# Patient Record
Sex: MALE | Race: WHITE | NOT HISPANIC OR LATINO | URBAN - METROPOLITAN AREA
[De-identification: names, ages, dates, MRNs, and addresses within clinical notes are randomized per-mention and may not be internally consistent; named-entity substitution may affect disease eponyms.]

---

## 2021-12-29 ENCOUNTER — OUTPATIENT (OUTPATIENT)
Dept: OUTPATIENT SERVICES | Facility: HOSPITAL | Age: 61
LOS: 1 days | Discharge: HOME | End: 2021-12-29
Payer: COMMERCIAL

## 2021-12-29 DIAGNOSIS — C85.90 NON-HODGKIN LYMPHOMA, UNSPECIFIED, UNSPECIFIED SITE: ICD-10-CM

## 2021-12-29 PROCEDURE — 71260 CT THORAX DX C+: CPT | Mod: 26

## 2021-12-29 PROCEDURE — 74177 CT ABD & PELVIS W/CONTRAST: CPT | Mod: 26

## 2022-01-05 ENCOUNTER — OUTPATIENT (OUTPATIENT)
Dept: OUTPATIENT SERVICES | Facility: HOSPITAL | Age: 62
LOS: 1 days | Discharge: HOME | End: 2022-01-05
Payer: COMMERCIAL

## 2022-01-05 PROCEDURE — 88342 IMHCHEM/IMCYTCHM 1ST ANTB: CPT | Mod: 26

## 2022-01-05 PROCEDURE — 88312 SPECIAL STAINS GROUP 1: CPT | Mod: 26

## 2022-01-05 PROCEDURE — 88305 TISSUE EXAM BY PATHOLOGIST: CPT | Mod: 26

## 2022-01-05 PROCEDURE — 88172 CYTP DX EVAL FNA 1ST EA SITE: CPT | Mod: 26,59

## 2022-01-05 PROCEDURE — 88307 TISSUE EXAM BY PATHOLOGIST: CPT | Mod: 26

## 2022-01-05 PROCEDURE — 76942 ECHO GUIDE FOR BIOPSY: CPT | Mod: 26

## 2022-01-05 PROCEDURE — 88173 CYTOPATH EVAL FNA REPORT: CPT | Mod: 26,59

## 2022-01-05 PROCEDURE — 88333 PATH CONSLTJ SURG CYTO XM 1: CPT | Mod: 26,59

## 2022-01-05 PROCEDURE — 88189 FLOWCYTOMETRY/READ 16 & >: CPT

## 2022-01-05 PROCEDURE — 88112 CYTOPATH CELL ENHANCE TECH: CPT | Mod: 26,59

## 2022-01-05 PROCEDURE — 38505 NEEDLE BIOPSY LYMPH NODES: CPT

## 2022-01-05 PROCEDURE — 88341 IMHCHEM/IMCYTCHM EA ADD ANTB: CPT | Mod: 26

## 2022-01-07 LAB — TM INTERPRETATION: SIGNIFICANT CHANGE UP

## 2022-01-12 LAB
NON-GYNECOLOGICAL CYTOLOGY STUDY: SIGNIFICANT CHANGE UP
NON-GYNECOLOGICAL CYTOLOGY STUDY: SIGNIFICANT CHANGE UP
SURGICAL PATHOLOGY STUDY: SIGNIFICANT CHANGE UP

## 2022-01-19 DIAGNOSIS — R59.0 LOCALIZED ENLARGED LYMPH NODES: ICD-10-CM

## 2024-04-09 PROBLEM — Z00.00 ENCOUNTER FOR PREVENTIVE HEALTH EXAMINATION: Status: ACTIVE | Noted: 2024-04-09

## 2024-05-01 ENCOUNTER — OUTPATIENT (OUTPATIENT)
Dept: OUTPATIENT SERVICES | Facility: HOSPITAL | Age: 64
LOS: 1 days | End: 2024-05-01
Payer: COMMERCIAL

## 2024-05-01 DIAGNOSIS — Z00.8 ENCOUNTER FOR OTHER GENERAL EXAMINATION: ICD-10-CM

## 2024-05-01 DIAGNOSIS — R07.9 CHEST PAIN, UNSPECIFIED: ICD-10-CM

## 2024-05-01 PROCEDURE — 75574 CT ANGIO HRT W/3D IMAGE: CPT

## 2024-05-01 PROCEDURE — 75574 CT ANGIO HRT W/3D IMAGE: CPT | Mod: 26

## 2024-05-02 DIAGNOSIS — R07.9 CHEST PAIN, UNSPECIFIED: ICD-10-CM

## 2024-05-23 ENCOUNTER — INPATIENT (INPATIENT)
Facility: HOSPITAL | Age: 64
LOS: 0 days | Discharge: HOME | DRG: 854 | End: 2024-05-24
Attending: SURGERY | Admitting: SURGERY
Payer: COMMERCIAL

## 2024-05-23 VITALS
HEART RATE: 129 BPM | TEMPERATURE: 101 F | SYSTOLIC BLOOD PRESSURE: 121 MMHG | DIASTOLIC BLOOD PRESSURE: 79 MMHG | OXYGEN SATURATION: 96 % | RESPIRATION RATE: 19 BRPM

## 2024-05-23 DIAGNOSIS — K35.80 UNSPECIFIED ACUTE APPENDICITIS: ICD-10-CM

## 2024-05-23 DIAGNOSIS — A41.9 SEPSIS, UNSPECIFIED ORGANISM: ICD-10-CM

## 2024-05-23 LAB
ALBUMIN SERPL ELPH-MCNC: 4.8 G/DL — SIGNIFICANT CHANGE UP (ref 3.5–5.2)
ALP SERPL-CCNC: 79 U/L — SIGNIFICANT CHANGE UP (ref 30–115)
ALT FLD-CCNC: 16 U/L — SIGNIFICANT CHANGE UP (ref 0–41)
ANION GAP SERPL CALC-SCNC: 14 MMOL/L — SIGNIFICANT CHANGE UP (ref 7–14)
ANION GAP SERPL CALC-SCNC: 9 MMOL/L — SIGNIFICANT CHANGE UP (ref 7–14)
APPEARANCE UR: CLEAR — SIGNIFICANT CHANGE UP
AST SERPL-CCNC: 14 U/L — SIGNIFICANT CHANGE UP (ref 0–41)
BACTERIA # UR AUTO: NEGATIVE /HPF — SIGNIFICANT CHANGE UP
BASE EXCESS BLDV CALC-SCNC: 7.1 MMOL/L — HIGH (ref -2–3)
BASOPHILS # BLD AUTO: 0.03 K/UL — SIGNIFICANT CHANGE UP (ref 0–0.2)
BASOPHILS NFR BLD AUTO: 0.2 % — SIGNIFICANT CHANGE UP (ref 0–1)
BILIRUB SERPL-MCNC: 1.1 MG/DL — SIGNIFICANT CHANGE UP (ref 0.2–1.2)
BILIRUB UR-MCNC: NEGATIVE — SIGNIFICANT CHANGE UP
BUN SERPL-MCNC: 17 MG/DL — SIGNIFICANT CHANGE UP (ref 10–20)
BUN SERPL-MCNC: 18 MG/DL — SIGNIFICANT CHANGE UP (ref 10–20)
CA-I SERPL-SCNC: 1.25 MMOL/L — SIGNIFICANT CHANGE UP (ref 1.15–1.33)
CALCIUM SERPL-MCNC: 10 MG/DL — SIGNIFICANT CHANGE UP (ref 8.4–10.5)
CALCIUM SERPL-MCNC: 9.4 MG/DL — SIGNIFICANT CHANGE UP (ref 8.4–10.5)
CAST: 3 /LPF — SIGNIFICANT CHANGE UP (ref 0–4)
CHLORIDE SERPL-SCNC: 101 MMOL/L — SIGNIFICANT CHANGE UP (ref 98–110)
CHLORIDE SERPL-SCNC: 98 MMOL/L — SIGNIFICANT CHANGE UP (ref 98–110)
CO2 SERPL-SCNC: 26 MMOL/L — SIGNIFICANT CHANGE UP (ref 17–32)
CO2 SERPL-SCNC: 27 MMOL/L — SIGNIFICANT CHANGE UP (ref 17–32)
COLOR SPEC: ABNORMAL
CREAT SERPL-MCNC: 1 MG/DL — SIGNIFICANT CHANGE UP (ref 0.7–1.5)
CREAT SERPL-MCNC: 1.1 MG/DL — SIGNIFICANT CHANGE UP (ref 0.7–1.5)
DIFF PNL FLD: NEGATIVE — SIGNIFICANT CHANGE UP
EGFR: 76 ML/MIN/1.73M2 — SIGNIFICANT CHANGE UP
EGFR: 85 ML/MIN/1.73M2 — SIGNIFICANT CHANGE UP
EOSINOPHIL # BLD AUTO: 0 K/UL — SIGNIFICANT CHANGE UP (ref 0–0.7)
EOSINOPHIL NFR BLD AUTO: 0 % — SIGNIFICANT CHANGE UP (ref 0–8)
GAS PNL BLDV: 133 MMOL/L — LOW (ref 136–145)
GAS PNL BLDV: SIGNIFICANT CHANGE UP
GAS PNL BLDV: SIGNIFICANT CHANGE UP
GLUCOSE SERPL-MCNC: 86 MG/DL — SIGNIFICANT CHANGE UP (ref 70–99)
GLUCOSE SERPL-MCNC: 88 MG/DL — SIGNIFICANT CHANGE UP (ref 70–99)
GLUCOSE UR QL: NEGATIVE MG/DL — SIGNIFICANT CHANGE UP
HCO3 BLDV-SCNC: 31 MMOL/L — HIGH (ref 22–29)
HCT VFR BLD CALC: 44.6 % — SIGNIFICANT CHANGE UP (ref 42–52)
HCT VFR BLD CALC: 46.1 % — SIGNIFICANT CHANGE UP (ref 42–52)
HCT VFR BLDA CALC: 49 % — SIGNIFICANT CHANGE UP (ref 39–51)
HGB BLD CALC-MCNC: 16.3 G/DL — SIGNIFICANT CHANGE UP (ref 12.6–17.4)
HGB BLD-MCNC: 15.2 G/DL — SIGNIFICANT CHANGE UP (ref 14–18)
HGB BLD-MCNC: 16 G/DL — SIGNIFICANT CHANGE UP (ref 14–18)
IMM GRANULOCYTES NFR BLD AUTO: 0.6 % — HIGH (ref 0.1–0.3)
KETONES UR-MCNC: 15 MG/DL
LACTATE BLDV-MCNC: 1.4 MMOL/L — SIGNIFICANT CHANGE UP (ref 0.5–2)
LACTATE SERPL-SCNC: 1.3 MMOL/L — SIGNIFICANT CHANGE UP (ref 0.7–2)
LEUKOCYTE ESTERASE UR-ACNC: NEGATIVE — SIGNIFICANT CHANGE UP
LYMPHOCYTES # BLD AUTO: 0.28 K/UL — LOW (ref 1.2–3.4)
LYMPHOCYTES # BLD AUTO: 1.9 % — LOW (ref 20.5–51.1)
MAGNESIUM SERPL-MCNC: 1.8 MG/DL — SIGNIFICANT CHANGE UP (ref 1.8–2.4)
MCHC RBC-ENTMCNC: 31.2 PG — HIGH (ref 27–31)
MCHC RBC-ENTMCNC: 31.5 PG — HIGH (ref 27–31)
MCHC RBC-ENTMCNC: 34.1 G/DL — SIGNIFICANT CHANGE UP (ref 32–37)
MCHC RBC-ENTMCNC: 34.7 G/DL — SIGNIFICANT CHANGE UP (ref 32–37)
MCV RBC AUTO: 90.7 FL — SIGNIFICANT CHANGE UP (ref 80–94)
MCV RBC AUTO: 91.6 FL — SIGNIFICANT CHANGE UP (ref 80–94)
MONOCYTES # BLD AUTO: 0.76 K/UL — HIGH (ref 0.1–0.6)
MONOCYTES NFR BLD AUTO: 5.2 % — SIGNIFICANT CHANGE UP (ref 1.7–9.3)
NEUTROPHILS # BLD AUTO: 13.4 K/UL — HIGH (ref 1.4–6.5)
NEUTROPHILS NFR BLD AUTO: 92.1 % — HIGH (ref 42.2–75.2)
NITRITE UR-MCNC: NEGATIVE — SIGNIFICANT CHANGE UP
NRBC # BLD: 0 /100 WBCS — SIGNIFICANT CHANGE UP (ref 0–0)
NRBC # BLD: 0 /100 WBCS — SIGNIFICANT CHANGE UP (ref 0–0)
PCO2 BLDV: 41 MMHG — LOW (ref 42–55)
PH BLDV: 7.49 — HIGH (ref 7.32–7.43)
PH UR: 6 — SIGNIFICANT CHANGE UP (ref 5–8)
PHOSPHATE SERPL-MCNC: 2.9 MG/DL — SIGNIFICANT CHANGE UP (ref 2.1–4.9)
PLATELET # BLD AUTO: 139 K/UL — SIGNIFICANT CHANGE UP (ref 130–400)
PLATELET # BLD AUTO: 144 K/UL — SIGNIFICANT CHANGE UP (ref 130–400)
PMV BLD: 10.4 FL — SIGNIFICANT CHANGE UP (ref 7.4–10.4)
PMV BLD: 10.5 FL — HIGH (ref 7.4–10.4)
PO2 BLDV: 43 MMHG — SIGNIFICANT CHANGE UP (ref 25–45)
POTASSIUM BLDV-SCNC: 3.8 MMOL/L — SIGNIFICANT CHANGE UP (ref 3.5–5.1)
POTASSIUM SERPL-MCNC: 3.8 MMOL/L — SIGNIFICANT CHANGE UP (ref 3.5–5)
POTASSIUM SERPL-MCNC: 4 MMOL/L — SIGNIFICANT CHANGE UP (ref 3.5–5)
POTASSIUM SERPL-SCNC: 3.8 MMOL/L — SIGNIFICANT CHANGE UP (ref 3.5–5)
POTASSIUM SERPL-SCNC: 4 MMOL/L — SIGNIFICANT CHANGE UP (ref 3.5–5)
PROT SERPL-MCNC: 7.1 G/DL — SIGNIFICANT CHANGE UP (ref 6–8)
PROT UR-MCNC: 30 MG/DL
RBC # BLD: 4.87 M/UL — SIGNIFICANT CHANGE UP (ref 4.7–6.1)
RBC # BLD: 5.08 M/UL — SIGNIFICANT CHANGE UP (ref 4.7–6.1)
RBC # FLD: 12 % — SIGNIFICANT CHANGE UP (ref 11.5–14.5)
RBC # FLD: 12.2 % — SIGNIFICANT CHANGE UP (ref 11.5–14.5)
RBC CASTS # UR COMP ASSIST: 0 /HPF — SIGNIFICANT CHANGE UP (ref 0–4)
SAO2 % BLDV: 79.2 % — SIGNIFICANT CHANGE UP (ref 67–88)
SODIUM SERPL-SCNC: 136 MMOL/L — SIGNIFICANT CHANGE UP (ref 135–146)
SODIUM SERPL-SCNC: 139 MMOL/L — SIGNIFICANT CHANGE UP (ref 135–146)
SP GR SPEC: 1.01 — SIGNIFICANT CHANGE UP (ref 1–1.03)
SQUAMOUS # UR AUTO: 0 /HPF — SIGNIFICANT CHANGE UP (ref 0–5)
UROBILINOGEN FLD QL: 0.2 MG/DL — SIGNIFICANT CHANGE UP (ref 0.2–1)
WBC # BLD: 14.56 K/UL — HIGH (ref 4.8–10.8)
WBC # BLD: 16.03 K/UL — HIGH (ref 4.8–10.8)
WBC # FLD AUTO: 14.56 K/UL — HIGH (ref 4.8–10.8)
WBC # FLD AUTO: 16.03 K/UL — HIGH (ref 4.8–10.8)
WBC UR QL: 1 /HPF — SIGNIFICANT CHANGE UP (ref 0–5)

## 2024-05-23 PROCEDURE — 99285 EMERGENCY DEPT VISIT HI MDM: CPT

## 2024-05-23 PROCEDURE — 74177 CT ABD & PELVIS W/CONTRAST: CPT | Mod: 26,MC

## 2024-05-23 PROCEDURE — 88304 TISSUE EXAM BY PATHOLOGIST: CPT

## 2024-05-23 PROCEDURE — 99223 1ST HOSP IP/OBS HIGH 75: CPT | Mod: 57

## 2024-05-23 PROCEDURE — C9399: CPT

## 2024-05-23 PROCEDURE — 85027 COMPLETE CBC AUTOMATED: CPT

## 2024-05-23 PROCEDURE — 71045 X-RAY EXAM CHEST 1 VIEW: CPT | Mod: 26

## 2024-05-23 PROCEDURE — 80048 BASIC METABOLIC PNL TOTAL CA: CPT

## 2024-05-23 PROCEDURE — 36415 COLL VENOUS BLD VENIPUNCTURE: CPT

## 2024-05-23 PROCEDURE — 93010 ELECTROCARDIOGRAM REPORT: CPT

## 2024-05-23 PROCEDURE — C1889: CPT

## 2024-05-23 PROCEDURE — 83735 ASSAY OF MAGNESIUM: CPT

## 2024-05-23 PROCEDURE — 84100 ASSAY OF PHOSPHORUS: CPT

## 2024-05-23 RX ORDER — METRONIDAZOLE 500 MG
500 TABLET ORAL ONCE
Refills: 0 | Status: COMPLETED | OUTPATIENT
Start: 2024-05-23 | End: 2024-05-23

## 2024-05-23 RX ORDER — ACETAMINOPHEN 500 MG
975 TABLET ORAL ONCE
Refills: 0 | Status: COMPLETED | OUTPATIENT
Start: 2024-05-23 | End: 2024-05-23

## 2024-05-23 RX ORDER — HEPARIN SODIUM 5000 [USP'U]/ML
5000 INJECTION INTRAVENOUS; SUBCUTANEOUS EVERY 8 HOURS
Refills: 0 | Status: DISCONTINUED | OUTPATIENT
Start: 2024-05-23 | End: 2024-05-24

## 2024-05-23 RX ORDER — CEFEPIME 1 G/1
2000 INJECTION, POWDER, FOR SOLUTION INTRAMUSCULAR; INTRAVENOUS ONCE
Refills: 0 | Status: COMPLETED | OUTPATIENT
Start: 2024-05-23 | End: 2024-05-23

## 2024-05-23 RX ORDER — METRONIDAZOLE 500 MG
500 TABLET ORAL EVERY 12 HOURS
Refills: 0 | Status: DISCONTINUED | OUTPATIENT
Start: 2024-05-23 | End: 2024-05-24

## 2024-05-23 RX ORDER — CEFEPIME 1 G/1
2000 INJECTION, POWDER, FOR SOLUTION INTRAMUSCULAR; INTRAVENOUS EVERY 8 HOURS
Refills: 0 | Status: DISCONTINUED | OUTPATIENT
Start: 2024-05-23 | End: 2024-05-24

## 2024-05-23 RX ORDER — SODIUM CHLORIDE 9 MG/ML
1000 INJECTION, SOLUTION INTRAVENOUS
Refills: 0 | Status: DISCONTINUED | OUTPATIENT
Start: 2024-05-23 | End: 2024-05-24

## 2024-05-23 RX ORDER — SODIUM CHLORIDE 9 MG/ML
2000 INJECTION, SOLUTION INTRAVENOUS ONCE
Refills: 0 | Status: COMPLETED | OUTPATIENT
Start: 2024-05-23 | End: 2024-05-23

## 2024-05-23 RX ORDER — MAGNESIUM SULFATE 500 MG/ML
2 VIAL (ML) INJECTION ONCE
Refills: 0 | Status: DISCONTINUED | OUTPATIENT
Start: 2024-05-23 | End: 2024-05-24

## 2024-05-23 RX ORDER — OXYCODONE HYDROCHLORIDE 5 MG/1
5 TABLET ORAL EVERY 6 HOURS
Refills: 0 | Status: DISCONTINUED | OUTPATIENT
Start: 2024-05-23 | End: 2024-05-24

## 2024-05-23 RX ORDER — ACETAMINOPHEN 500 MG
650 TABLET ORAL EVERY 6 HOURS
Refills: 0 | Status: DISCONTINUED | OUTPATIENT
Start: 2024-05-23 | End: 2024-05-24

## 2024-05-23 RX ORDER — KETOROLAC TROMETHAMINE 30 MG/ML
30 SYRINGE (ML) INJECTION ONCE
Refills: 0 | Status: DISCONTINUED | OUTPATIENT
Start: 2024-05-23 | End: 2024-05-23

## 2024-05-23 RX ADMIN — Medication 30 MILLIGRAM(S): at 11:55

## 2024-05-23 RX ADMIN — SODIUM CHLORIDE 120 MILLILITER(S): 9 INJECTION, SOLUTION INTRAVENOUS at 15:49

## 2024-05-23 RX ADMIN — Medication 975 MILLIGRAM(S): at 10:38

## 2024-05-23 RX ADMIN — Medication 650 MILLIGRAM(S): at 23:12

## 2024-05-23 RX ADMIN — CEFEPIME 100 MILLIGRAM(S): 1 INJECTION, POWDER, FOR SOLUTION INTRAMUSCULAR; INTRAVENOUS at 23:10

## 2024-05-23 RX ADMIN — CEFEPIME 100 MILLIGRAM(S): 1 INJECTION, POWDER, FOR SOLUTION INTRAMUSCULAR; INTRAVENOUS at 10:43

## 2024-05-23 RX ADMIN — Medication 100 MILLIGRAM(S): at 15:48

## 2024-05-23 RX ADMIN — Medication 975 MILLIGRAM(S): at 11:55

## 2024-05-23 RX ADMIN — SODIUM CHLORIDE 2000 MILLILITER(S): 9 INJECTION, SOLUTION INTRAVENOUS at 10:43

## 2024-05-23 RX ADMIN — Medication 30 MILLIGRAM(S): at 10:38

## 2024-05-23 RX ADMIN — SODIUM CHLORIDE 2000 MILLILITER(S): 9 INJECTION, SOLUTION INTRAVENOUS at 11:26

## 2024-05-23 NOTE — PATIENT PROFILE ADULT - FALL HARM RISK - UNIVERSAL INTERVENTIONS
Bed in lowest position, wheels locked, appropriate side rails in place/Call bell, personal items and telephone in reach/Instruct patient to call for assistance before getting out of bed or chair/Non-slip footwear when patient is out of bed/[place] to call system/Physically safe environment - no spills, clutter or unnecessary equipment/Purposeful Proactive Rounding/Room/bathroom lighting operational, light cord in reach

## 2024-05-23 NOTE — PATIENT PROFILE ADULT - FUNCTIONAL ASSESSMENT - DAILY ACTIVITY 1.
OBS: 2/19/24  IP: 2/20/24 (Met IP criteria)  DC: 2/22/24  LOS: < 3MN, +2MNP  
4 = No assist / stand by assistance

## 2024-05-23 NOTE — H&P ADULT - HISTORY OF PRESENT ILLNESS
Patient is a 64M w/ pmhx of testicular cancer in remission, arthritis who presents complaining of abdominal pain. He states he has had multiple episodes of back pain and abdominal pain since january. His PCP attributed the pain to arthritis in his back. The episodes went away on their own. This abdominal pain episode started approximately two days ago. He denies any nausea, vomiting, is passing gas and bowel movements, denies fevers, diarrhea, shortness of breath or chest pain at this time.

## 2024-05-23 NOTE — H&P ADULT - NSHPPHYSICALEXAM_GEN_ALL_CORE
PHYSICAL EXAM:  General: NAD, AAOx3, calm and cooperative  HEENT: NCAT, EWA, EOMI, Trachea ML, Neck supple  Cardiac: RRR S1, S2, no Murmurs, rubs or gallops  Respiratory: CTAB, normal respiratory effort, breath sounds equal BL, no wheeze, rhonchi or crackles  Abdomen: Soft, non-distended, RLQ moderately tender to palpation  Musculoskeletal: Strength 5/5 BL UE/LE, ROM intact, compartments soft  Neuro: Sensation grossly intact and equal throughout, no focal deficits  Vascular: Pulses 2+ throughout, extremities well perfused  Skin: Warm/dry, normal color, no jaundice

## 2024-05-23 NOTE — ED PROVIDER NOTE - OBJECTIVE STATEMENT
65 y/o M w pmhx of testicular cancer in remission, arthritis, and hypertension presents to the emergency department with lower back pain for the past 2 to 3 days. Back pain is similar to previous pain in past. Patient reports today he acutely woke up with fever chills and nausea.  Patient reports diaphoresis.  Poor appetite but able to tolerate p.o. intake.  Denies chest pain shortness of breath diarrhea abdominal pain testicular pain saddle anesthesia urinary sx or dysuria.

## 2024-05-23 NOTE — H&P ADULT - NSHPLABSRESULTS_GEN_ALL_CORE
LAB/STUDIES:                        16.0   14.56 )-----------( 139      ( 23 May 2024 10:35 )             46.1         139  |  98  |  17  ----------------------------<  86  3.8   |  27  |  1.1    Ca    10.0      23 May 2024 10:35    TPro  7.1  /  Alb  4.8  /  TBili  1.1  /  DBili  x   /  AST  14  /  ALT  16  /  AlkPhos  79        LIVER FUNCTIONS - ( 23 May 2024 10:35 )  Alb: 4.8 g/dL / Pro: 7.1 g/dL / ALK PHOS: 79 U/L / ALT: 16 U/L / AST: 14 U/L / GGT: x           Urinalysis Basic - ( 23 May 2024 11:14 )    Color: Orange / Appearance: Clear / S.014 / pH: x  Gluc: x / Ketone: 15 mg/dL  / Bili: Negative / Urobili: 0.2 mg/dL   Blood: x / Protein: 30 mg/dL / Nitrite: Negative   Leuk Esterase: Negative / RBC: 0 /HPF / WBC 1 /HPF   Sq Epi: x / Non Sq Epi: 0 /HPF / Bacteria: Negative /HPF                  Urinalysis with Rflx Culture (collected 23 May 2024 11:14)      IMAGING:    < from: CT Abdomen and Pelvis w/ IV Cont (24 @ 13:11) >    *1.Findings are compatible with acute appendicitis, as detailed above; no   focal drainable fluid collection or free intraperitoneal air.  2. Diffuse hepatic steatosis.    < end of copied text >    < from: Xray Chest 1 View- PORTABLE-Urgent (24 @ 10:59) >    Bibasilar atelectasis.    < end of copied text >

## 2024-05-23 NOTE — ED ADULT NURSE NOTE - NSFALLUNIVINTERV_ED_ALL_ED
Assistance with ambulation/Monitor gait and stability/Bed/Stretcher in lowest position, wheels locked, appropriate side rails in place/Call bell, personal items and telephone in reach/Instruct patient to call for assistance before getting out of bed/chair/stretcher/Non-slip footwear applied when patient is off stretcher/[place] to call system/Physically safe environment - no spills, clutter or unnecessary equipment/Purposeful proactive rounding/Room/bathroom lighting operational, light cord in reach

## 2024-05-23 NOTE — ED PROVIDER NOTE - PROGRESS NOTE DETAILS
Authored by Dr. Kramer: sepsis huddle was initiated w Dr Hays and nurse Lashon, fluids and IV abx given. Upon results of CT, surg immediately consulted and pt admitted to their service.

## 2024-05-23 NOTE — ED PROVIDER NOTE - CLINICAL SUMMARY MEDICAL DECISION MAKING FREE TEXT BOX
64 male history of testicular cancerub remission present with back pain/abdominal pain associated with fever.  Patient found to have appendicitis with sepsis.  Given antibiotics admitted to hospital.  Blood pressure stable at time admission.

## 2024-05-23 NOTE — H&P ADULT - ATTENDING COMMENTS
Trauma/Acute Care Surgery Attending Note Attestation    Patient was seen and examined bedside.  I reviewed the resident/PA note and agreed with above assessment and plan with following additions and corrections.    64M PMH testicular cancer and arthritis who came to hospital with persistent nausea and lower back pain for several days. Lower back pain is similar to what he has had in the past. He has not had any significant oral intake since Tuesday due to persistent nausea and poor appetite. Minimal abdominal discomfort. Had fevers today with chills. No chest pain, SOB, bloody BM, diarrhea.    T(C): 36.7 (05-23-24 @ 16:13), Max: 38.5 (05-23-24 @ 08:55)  HR: 96 (05-23-24 @ 16:13) (96 - 129)  BP: 109/71 (05-23-24 @ 16:13) (109/71 - 122/74)  RR: 20 (05-23-24 @ 16:13) (18 - 20)  SpO2: 96% (05-23-24 @ 16:13) (96% - 96%)      I independently performed a medically appropriate exam. The exam was notable for mild tenderness to palpation in RLQ.                          16.0   14.56 )-----------( 139      ( 23 May 2024 10:35 )             46.1     05-23    139  |  98  |  17  ----------------------------<  86  3.8   |  27  |  1.1    Ca 10.0; Mg x ; Phos x       ( 23 May 2024 10:35 )  Alb: 4.8 g/dL / Pro: 7.1 g/dL / AlkPhos: 79 U/L / ALT: 16 U/L / AST: 14 U/L / GGT:x     / Tbili 1.1 mg/dL/ Dbili x     / Indbili x          Blood Gas Venous - Lactate: 1.4 mmol/L (05-23 @ 10:47)  Lactate, Blood: 1.3 mmol/L (05-23 @ 10:35)    CT A/P reviewed and interpreted by me: dilated appendix with surrounding fat stranding and appendicolith at base of appendix    Assessment/Plan:  64y Male PMH testicular cancer and arthritis with acute appendicitis with sepsis (Leukocytosis, fevers, tachycardia)  - Admit to surgery  - Keep NPO with IVF  - Cefepime given in ED, add Flagyl for abx  - Plan for laparoscopic appendectomy, possible open. Discussed operative versus nonoperative management with antibiotics. Patient at higher risk for complication due to appendicolith. Patient consented for laparoscopic appendectomy, possible open.    Villa Dorman MD  Trauma/Acute Care Surgery/Surgical Critical Care Attending

## 2024-05-23 NOTE — H&P ADULT - ASSESSMENT
ASSESSMENT:  Patient is a 64M w/ pmhx of testicular cancer in remission, arthritis who presents complaining of abdominal pain. His presentation is concerning for acute appendicitis with appendicolith    Physical exam findings, imaging, and labs as documented above.     PLAN:  - Admit to Dr. Dorman on the surgical service  - Add on for OR for laparoscopic appendectomy, possible open today  - NPO, IVF, IV abx- cefepime and flagyl  - Preop labs  - DVT ppx  - Pain control  - Encourage ambulation, IS  - HD monitoring    Above plan discussed with Attending Surgeon Dr. Dorman , patient, patient family, and ED team  05-23-24 @ 15:26

## 2024-05-23 NOTE — ED PROVIDER NOTE - PHYSICAL EXAMINATION
VITAL SIGNS: I have reviewed nursing notes and confirm.  CONSTITUTIONAL: + diaphoretic and ill appearing  SKIN: no rash, no petechiae.  EYES: pink conjunctiva, anicteric  ENT: tongue midline, no exudates, MMM  NECK: Supple; no meningismus  CARD: + tachy, no murmurs, equal radial pulses bilaterally 2+  RESP: CTAB, no respiratory distress  ABD: Soft, non-tender, non-distended, no peritoneal signs  BACK: no spinal tenderness or deformities   EXT: Normal ROM x4. No edema. No calves tenderness  NEURO: Alert, oriented, no focal deficts

## 2024-05-24 ENCOUNTER — TRANSCRIPTION ENCOUNTER (OUTPATIENT)
Age: 64
End: 2024-05-24

## 2024-05-24 VITALS
DIASTOLIC BLOOD PRESSURE: 66 MMHG | TEMPERATURE: 100 F | HEART RATE: 97 BPM | SYSTOLIC BLOOD PRESSURE: 103 MMHG | RESPIRATION RATE: 18 BRPM | OXYGEN SATURATION: 98 %

## 2024-05-24 PROCEDURE — 99024 POSTOP FOLLOW-UP VISIT: CPT

## 2024-05-24 PROCEDURE — 44970 LAPAROSCOPY APPENDECTOMY: CPT

## 2024-05-24 RX ORDER — OXYCODONE HYDROCHLORIDE 5 MG/1
1 TABLET ORAL
Qty: 8 | Refills: 0
Start: 2024-05-24 | End: 2024-05-25

## 2024-05-24 RX ORDER — SODIUM CHLORIDE 9 MG/ML
1000 INJECTION, SOLUTION INTRAVENOUS
Refills: 0 | Status: DISCONTINUED | OUTPATIENT
Start: 2024-05-24 | End: 2024-05-24

## 2024-05-24 RX ORDER — ACETAMINOPHEN 500 MG
2 TABLET ORAL
Qty: 0 | Refills: 0 | DISCHARGE
Start: 2024-05-24

## 2024-05-24 RX ORDER — KETOROLAC TROMETHAMINE 30 MG/ML
15 SYRINGE (ML) INJECTION EVERY 6 HOURS
Refills: 0 | Status: DISCONTINUED | OUTPATIENT
Start: 2024-05-24 | End: 2024-05-24

## 2024-05-24 RX ORDER — HYDROMORPHONE HYDROCHLORIDE 2 MG/ML
0.5 INJECTION INTRAMUSCULAR; INTRAVENOUS; SUBCUTANEOUS
Refills: 0 | Status: DISCONTINUED | OUTPATIENT
Start: 2024-05-24 | End: 2024-05-24

## 2024-05-24 RX ADMIN — HEPARIN SODIUM 5000 UNIT(S): 5000 INJECTION INTRAVENOUS; SUBCUTANEOUS at 05:19

## 2024-05-24 RX ADMIN — Medication 100 MILLIGRAM(S): at 05:19

## 2024-05-24 RX ADMIN — CEFEPIME 100 MILLIGRAM(S): 1 INJECTION, POWDER, FOR SOLUTION INTRAMUSCULAR; INTRAVENOUS at 05:19

## 2024-05-24 RX ADMIN — Medication 650 MILLIGRAM(S): at 05:19

## 2024-05-24 NOTE — DISCHARGE NOTE PROVIDER - NSDCMRMEDTOKEN_GEN_ALL_CORE_FT
acetaminophen 325 mg oral tablet: 2 tab(s) orally every 6 hours  amoxicillin-clavulanate 875 mg-125 mg oral tablet: 875 milligram(s) orally 2 times a day  oxyCODONE 5 mg oral tablet: 1 tab(s) orally every 6 hours as needed for Severe Pain (7 - 10) MDD: 4

## 2024-05-24 NOTE — DISCHARGE NOTE PROVIDER - HOSPITAL COURSE
64M w/ pmhx of testicular cancer in remission, arthritis who presents complaining of abdominal pain. He states he has had multiple episodes of back pain and abdominal pain since january. His PCP attributed the pain to arthritis in his back. The episodes went away on their own. This abdominal pain episode started approximately two days ago. He denies any nausea, vomiting, is passing gas and bowel movements, denies fevers, diarrhea, shortness of breath or chest pain at this time. CT findings consistent with acute appendicitis.    Patient admitted to the surgery team for lap appendectomy. Patient started on IV ABX, kept NPO and taken to the OR. Operative findings: Acutely inflamed, gangrenous nonperforated appendix. Mesoappendix taken with ligasure, base taken with endo antoine. Patient tolerated procedure well, post op diet progressed accordingly. DC home today with PO abx.

## 2024-05-24 NOTE — DISCHARGE NOTE NURSING/CASE MANAGEMENT/SOCIAL WORK - PATIENT PORTAL LINK FT
You can access the FollowMyHealth Patient Portal offered by [facility] by registering at the following website: [email or web address]. By joining [employer]’s FollowMyHealth portal, you will also be able to view your health information using other applications (apps) compatible with our system.

## 2024-05-24 NOTE — BRIEF OPERATIVE NOTE - OPERATION/FINDINGS
Acutely inflamed, gangrenous nonperforated appendix. Mesoappendix taken with ligasure, base taken with endo antoine.

## 2024-05-24 NOTE — PROGRESS NOTE ADULT - SUBJECTIVE AND OBJECTIVE BOX
GENERAL SURGERY PROGRESS NOTE     MICHAEL RANDALL  64y  Male  Hospital day :1d  POD:  Procedure: Laparoscopic appendectomy      OVERNIGHT EVENTS: patient s/p lap appy, recovering well. Pain controlled. No nausea or vomiting, tolerating diet.     T(F): 99.3 (05-24-24 @ 00:50), Max: 101.3 (05-23-24 @ 08:55)  HR: 82 (05-24-24 @ 03:30) (79 - 129)  BP: 112/64 (05-24-24 @ 03:30) (107/58 - 131/75)  ABP: --  ABP(mean): --  RR: 20 (05-24-24 @ 03:30) (18 - 23)  SpO2: 96% (05-24-24 @ 03:30) (95% - 99%)    DIET/FLUIDS: lactated ringers. 1000 milliLiter(s) IV Continuous <Continuous>  lactated ringers. 1000 milliLiter(s) IV Continuous <Continuous>  magnesium sulfate  IVPB 2 Gram(s) IV Intermittent once    NG:                                                                              DRAINS:   BM:   EMESIS:   URINE:    GI proph:    AC/ proph: heparin   Injectable 5000 Unit(s) SubCutaneous every 8 hours    ABx: cefepime   IVPB 2000 milliGRAM(s) IV Intermittent every 8 hours  metroNIDAZOLE  IVPB 500 milliGRAM(s) IV Intermittent every 12 hours      PHYSICAL EXAM:  GENERAL: NAD, well-appearing  CHEST/LUNG: Non-labored breathing  HEART: Regular rate and rhythm  ABDOMEN: Soft, minimally tender to palpation, Nondistended; incisions c/d/i      LABS  Labs:  CAPILLARY BLOOD GLUCOSE                              15.2   16.03 )-----------( 144      ( 23 May 2024 21:36 )             44.6       Auto Neutrophil %: 92.1 % (05-23-24 @ 10:35)  Auto Immature Granulocyte %: 0.6 % (05-23-24 @ 10:35)    05-23    136  |  101  |  18  ----------------------------<  88  4.0   |  26  |  1.0      Calcium: 9.4 mg/dL (05-23-24 @ 21:36)      LFTs:             7.1  | 1.1  | 14       ------------------[79      ( 23 May 2024 10:35 )  4.8  | x    | 16          Lipase:x      Amylase:x         Blood Gas Venous - Lactate: 1.4 mmol/L (05-23-24 @ 10:47)  Lactate, Blood: 1.3 mmol/L (05-23-24 @ 10:35)      Coags:            Urinalysis Basic - ( 23 May 2024 21:36 )    Color: x / Appearance: x / SG: x / pH: x  Gluc: 88 mg/dL / Ketone: x  / Bili: x / Urobili: x   Blood: x / Protein: x / Nitrite: x   Leuk Esterase: x / RBC: x / WBC x   Sq Epi: x / Non Sq Epi: x / Bacteria: x        Urinalysis with Rflx Culture (collected 23 May 2024 11:14)          RADIOLOGY & ADDITIONAL TESTS:      A/P

## 2024-05-24 NOTE — DISCHARGE NOTE PROVIDER - CARE PROVIDER_API CALL
Villa Dorman J  Surgery  [address] [place] [id number]  Phone: [phone]  Fax: [phone]  Follow Up Time: 2 weeks

## 2024-05-24 NOTE — DISCHARGE NOTE PROVIDER - NSDCCPCAREPLAN_GEN_ALL_CORE_FT
PRINCIPAL DISCHARGE DIAGNOSIS  Diagnosis: Acute appendicitis  Assessment and Plan of Treatment: -Diet: Continue regular diet.  -Activity: Avoid heavy lifting or straining (anything over 10-15 pounds) for at least 6 weeks. You may ambulate and otherwise resume normal daily activities as tolerated.   -Incisions: You may remove your dressings in 48hrs. You may shower and allow soap and water to rinse over incisions. Please avoid scrubbing the areas and do not submerge your incisions in water for at least 2 weeks.  -Medications: You may resume your home medications. You may take Tylenol 650mg every 6 hours and alternate with Ibuprofen 600mg every 8 hours for pain. A prescription has been sent to your pharmacy for Oxycodone 5mg every 6 hours only as needed for severe breakthough pain after taking Tylenol and Motrin. Please do not use this medication when driving or operating heavy machinery as it can make your drowsy.  -Follow-up: Please call the office to schedule a follow-up appointment with Dr. Dorman within 2 weeks..  -Please call the office or return to the ED with persistent fever greater than 100.4F, uncontrollable nausea/vomiting/abdominal pain, constipation, inability to tolerate oral intake.      SECONDARY DISCHARGE DIAGNOSES  Diagnosis: Sepsis  Assessment and Plan of Treatment:

## 2024-05-24 NOTE — PROGRESS NOTE ADULT - ATTENDING COMMENTS
ACS Attending  Note Attestation    Patient is examined and evaluated at the bedside with the residents/PAs. Treatment plan discussed with the team, nurses, and consulting physicians and consulting teams. Medications, radiological studies and all other relevant studies reviewed.     MICHAEL RANDALL Patient is a 64y old  Male who presents with a chief complaint of abdominal pain due to Acute Appendicitis. S/P laparoscopic appendectomy    Able to void without difficulties  Alexia-incisional tenderness  Tolerates diet    Vital Signs Last 24 Hrs  T(C): 37.7 (24 May 2024 08:29), Max: 37.8 (24 May 2024 00:19)  T(F): 99.8 (24 May 2024 08:29), Max: 100 (24 May 2024 00:19)  HR: 97 (24 May 2024 08:29) (79 - 105)  BP: 103/66 (24 May 2024 08:29) (103/66 - 131/75)  BP(mean): 82 (24 May 2024 03:30) (77 - 87)  RR: 18 (24 May 2024 08:29) (18 - 23)  SpO2: 98% (24 May 2024 08:29) (95% - 99%)    Parameters below as of 24 May 2024 03:30  Patient On (Oxygen Delivery Method): nasal cannula  O2 Flow (L/min): 1                       15.2   16.03 )-----------( 144      ( 23 May 2024 21:36 )             44.6     05-23    136  |  101  |  18  ----------------------------<  88  4.0   |  26  |  1.0    Ca    9.4      23 May 2024 21:36  Phos  2.9     05-23  Mg     1.8     05-23    TPro  7.1  /  Alb  4.8  /  TBili  1.1  /  DBili  x   /  AST  14  /  ALT  16  /  AlkPhos  79  05-23      Diagnosis: Acute Appendicitis                  Leukocytosis      Plan:	  - supportive care  - GI/DVT prophylaxis  - pain management  - continue diet  - incentive spirometer    - follow up consults  - repeat studies as needed  - out of bed to ambulate  - replace electrolytes  - case management evaluation     Nydia De La Rosa MD, FACS  Trauma/ACS/Surgical Critical Care Attending

## 2024-05-24 NOTE — PROGRESS NOTE ADULT - ASSESSMENT
Patient is a 64M w/ pmhx of testicular cancer in remission, arthritis who presents complaining of abdominal pain. His presentation is concerning for acute appendicitis with appendicolith    Physical exam findings, imaging, and labs as documented above.     PLAN:  - cefepime, flagyl  - Regular diet  - DVT ppx  - Pain control  - Encourage ambulation, IS  - HD monitoring

## 2024-05-28 PROBLEM — M54.9 DORSALGIA, UNSPECIFIED: Chronic | Status: ACTIVE | Noted: 2024-05-23

## 2024-05-28 PROBLEM — C62.90 MALIGNANT NEOPLASM OF UNSPECIFIED TESTIS, UNSPECIFIED WHETHER DESCENDED OR UNDESCENDED: Chronic | Status: ACTIVE | Noted: 2024-05-23

## 2024-05-28 LAB
-  ENTEROBACTERALES: SIGNIFICANT CHANGE UP
CULTURE RESULTS: SIGNIFICANT CHANGE UP
GRAM STN FLD: ABNORMAL
METHOD TYPE: SIGNIFICANT CHANGE UP
SPECIMEN SOURCE: SIGNIFICANT CHANGE UP

## 2024-05-29 LAB — SURGICAL PATHOLOGY STUDY: SIGNIFICANT CHANGE UP

## 2024-05-30 ENCOUNTER — NON-APPOINTMENT (OUTPATIENT)
Age: 64
End: 2024-05-30

## 2024-05-30 DIAGNOSIS — M19.90 UNSPECIFIED OSTEOARTHRITIS, UNSPECIFIED SITE: ICD-10-CM

## 2024-05-30 DIAGNOSIS — C62.90 MALIGNANT NEOPLASM OF UNSPECIFIED TESTIS, UNSPECIFIED WHETHER DESCENDED OR UNDESCENDED: ICD-10-CM

## 2024-05-30 DIAGNOSIS — A41.9 SEPSIS, UNSPECIFIED ORGANISM: ICD-10-CM

## 2024-05-30 DIAGNOSIS — K35.31 ACUTE APPENDICITIS WITH LOCALIZED PERITONITIS AND GANGRENE, WITHOUT PERFORATION: ICD-10-CM

## 2024-05-30 LAB
-  AMPICILLIN/SULBACTAM: SIGNIFICANT CHANGE UP
-  AMPICILLIN: SIGNIFICANT CHANGE UP
-  AZTREONAM: SIGNIFICANT CHANGE UP
-  CEFAZOLIN: SIGNIFICANT CHANGE UP
-  CEFEPIME: SIGNIFICANT CHANGE UP
-  CEFOXITIN: SIGNIFICANT CHANGE UP
-  CEFTRIAXONE: SIGNIFICANT CHANGE UP
-  CIPROFLOXACIN: SIGNIFICANT CHANGE UP
-  ERTAPENEM: SIGNIFICANT CHANGE UP
-  GENTAMICIN: SIGNIFICANT CHANGE UP
-  IMIPENEM: SIGNIFICANT CHANGE UP
-  LEVOFLOXACIN: SIGNIFICANT CHANGE UP
-  MEROPENEM: SIGNIFICANT CHANGE UP
-  PIPERACILLIN/TAZOBACTAM: SIGNIFICANT CHANGE UP
-  TOBRAMYCIN: SIGNIFICANT CHANGE UP
-  TRIMETHOPRIM/SULFAMETHOXAZOLE: SIGNIFICANT CHANGE UP
CULTURE RESULTS: ABNORMAL
METHOD TYPE: SIGNIFICANT CHANGE UP
ORGANISM # SPEC MICROSCOPIC CNT: ABNORMAL
ORGANISM # SPEC MICROSCOPIC CNT: ABNORMAL
ORGANISM # SPEC MICROSCOPIC CNT: SIGNIFICANT CHANGE UP
SPECIMEN SOURCE: SIGNIFICANT CHANGE UP

## 2024-06-12 ENCOUNTER — APPOINTMENT (OUTPATIENT)
Dept: SURGERY | Facility: CLINIC | Age: 64
End: 2024-06-12
Payer: COMMERCIAL

## 2024-06-12 VITALS — DIASTOLIC BLOOD PRESSURE: 80 MMHG | SYSTOLIC BLOOD PRESSURE: 130 MMHG | WEIGHT: 177 LBS

## 2024-06-12 PROCEDURE — 99024 POSTOP FOLLOW-UP VISIT: CPT

## 2024-06-13 NOTE — HISTORY OF PRESENT ILLNESS
[de-identified] : 64M s/p lap appendectomy on 5/24/24 [de-identified] : Here for follow up. Doing well. Tolerating diet. No nausea, vomiting. Mild discomfort around umbilicus when stretching but otherwise minimal incisional discomfort. Passing flatus and BM without issue.

## 2024-10-10 NOTE — PATIENT PROFILE ADULT - FUNCTIONAL ASSESSMENT - BASIC MOBILITY ASSESSMENT TYPE
Medication: tizanidine 4 mg passed protocol.   Last office visit date: 07/22/24  Next appointment scheduled?: No;    Number of refills given: 1    
Admission